# Patient Record
Sex: MALE | Race: WHITE | NOT HISPANIC OR LATINO | Employment: UNEMPLOYED | URBAN - METROPOLITAN AREA
[De-identification: names, ages, dates, MRNs, and addresses within clinical notes are randomized per-mention and may not be internally consistent; named-entity substitution may affect disease eponyms.]

---

## 2020-01-20 ENCOUNTER — OFFICE VISIT - RIVER FALLS (OUTPATIENT)
Dept: FAMILY MEDICINE | Facility: CLINIC | Age: 30
End: 2020-01-20

## 2020-01-20 ASSESSMENT — MIFFLIN-ST. JEOR: SCORE: 2034.48

## 2020-03-12 ENCOUNTER — OFFICE VISIT - RIVER FALLS (OUTPATIENT)
Dept: FAMILY MEDICINE | Facility: CLINIC | Age: 30
End: 2020-03-12

## 2020-03-12 ASSESSMENT — MIFFLIN-ST. JEOR: SCORE: 2098.89

## 2020-03-20 ENCOUNTER — AMBULATORY - RIVER FALLS (OUTPATIENT)
Dept: FAMILY MEDICINE | Facility: CLINIC | Age: 30
End: 2020-03-20

## 2020-03-23 ENCOUNTER — OFFICE VISIT - RIVER FALLS (OUTPATIENT)
Dept: FAMILY MEDICINE | Facility: CLINIC | Age: 30
End: 2020-03-23

## 2020-03-25 ENCOUNTER — OFFICE VISIT - RIVER FALLS (OUTPATIENT)
Dept: FAMILY MEDICINE | Facility: CLINIC | Age: 30
End: 2020-03-25

## 2020-03-25 LAB
CORTIS SERPL-MCNC: 4 MCG/DL
LUTEINIZING HORMONE - HISTORICAL: 1.3 MIU/ML (ref 1.5–9.3)
Lab: 745 NG/ML (ref 267–616)
PROLACTIN: 19.7 NG/ML (ref 2–18)
T4 FREE SERPL-MCNC: 0.9 NG/DL (ref 0.8–1.8)
TESTOSTERONE TOTAL: 73 NG/DL (ref 250–827)
TSH SERPL DL<=0.005 MIU/L-ACNC: 2.02 MIU/L (ref 0.4–4.5)

## 2020-03-25 ASSESSMENT — MIFFLIN-ST. JEOR: SCORE: 2093.44

## 2020-03-26 LAB
CORTISOL, FREE, URINE: 3.8 (ref 4–50)
CREATININE URINE TIMED: 0.95 G/24 H (ref 0.5–2.15)
URINE VOLUME - HISTORICAL: 1350 ML

## 2022-02-03 ENCOUNTER — AMBULATORY - RIVER FALLS (OUTPATIENT)
Dept: FAMILY MEDICINE | Facility: CLINIC | Age: 32
End: 2022-02-03
Payer: COMMERCIAL

## 2022-02-05 ENCOUNTER — AMBULATORY - RIVER FALLS (OUTPATIENT)
Dept: FAMILY MEDICINE | Facility: CLINIC | Age: 32
End: 2022-02-05
Payer: COMMERCIAL

## 2022-02-11 VITALS
BODY MASS INDEX: 32.48 KG/M2 | DIASTOLIC BLOOD PRESSURE: 78 MMHG | OXYGEN SATURATION: 97 % | RESPIRATION RATE: 18 BRPM | WEIGHT: 232 LBS | TEMPERATURE: 97.7 F | HEIGHT: 71 IN | SYSTOLIC BLOOD PRESSURE: 122 MMHG | HEART RATE: 90 BPM

## 2022-02-11 VITALS
HEIGHT: 71 IN | SYSTOLIC BLOOD PRESSURE: 118 MMHG | TEMPERATURE: 97.8 F | HEART RATE: 74 BPM | HEIGHT: 71 IN | DIASTOLIC BLOOD PRESSURE: 64 MMHG | SYSTOLIC BLOOD PRESSURE: 132 MMHG | TEMPERATURE: 97.6 F | BODY MASS INDEX: 34.3 KG/M2 | WEIGHT: 245 LBS | HEART RATE: 60 BPM | DIASTOLIC BLOOD PRESSURE: 78 MMHG | BODY MASS INDEX: 34.47 KG/M2 | WEIGHT: 246.2 LBS

## 2022-02-15 ENCOUNTER — AMBULATORY - RIVER FALLS (OUTPATIENT)
Dept: FAMILY MEDICINE | Facility: CLINIC | Age: 32
End: 2022-02-15
Payer: COMMERCIAL

## 2022-02-16 NOTE — PROGRESS NOTES
Patient:   JOI VILLARREAL            MRN: [id number]            FIN: [id number]               Age:   29 years     Sex:  Male     :  1990   Associated Diagnoses:   Preoperative clearance; Pituitary macroadenoma   Author:   Aaron Gutiérrez MD      Chief Complaint   3/25/2020 9:03 AM CDT    Pre-op, [facility], Pituitary Gland. Does not know day of surgery but it will be next week.        Preoperative Information   Indication for surgery:  Neurologic disorder, pituitary macroadenoma.         Associated symptoms: decreased vision in right eye.    Accompanied by:  No one.    Source of history:  Self, Medical record.           Review of Systems   Constitutional:  No fever, No chills, No sweats, No weakness, No fatigue.    Eye:  Recent visual problem.    Ear/Nose/Mouth/Throat:  No decreased hearing, No nasal congestion, No sore throat.    Respiratory:  No shortness of breath, No cough.    Cardiovascular:  Negative, No chest pain, No palpitations, No peripheral edema.    Gastrointestinal:  No nausea, No vomiting, No diarrhea, No constipation, No heartburn.    Genitourinary:  No dysuria, No change in urine stream.    Hematology/Lymphatics:  No bruising tendency, No bleeding tendency.    Endocrine:  No cold intolerance, No heat intolerance.    Immunologic:  Negative.    Musculoskeletal:  No back pain, No neck pain, No joint pain, No muscle pain.    Integumentary:  No rash, No dryness, No skin lesion.    Neurologic:  Alert and oriented X4, No headache.    Psychiatric:  No anxiety, No depression.       Health Status   Allergies:    Allergic Reactions (Selected)  No known allergies   Medications:  (Selected)      Problem list:    All Problems  Obesity / SNOMED CT 5978959876 / Probable  Mild Asthma / ICD-9-.90 / Confirmed      Histories   Past Medical History:    Active  Mild Asthma (493.90)   Family History:    No family history items have been selected or recorded.   Procedure history:    No active procedure  history items have been selected or recorded.   Social History:        Alcohol Assessment: Current            Current, 2-3 times per week, 1 drinks/episode average.  3 drinks/episode maximum.      Tobacco Assessment: Denies Tobacco Use            Never       Has no history of anemia.  Has no history of DVT or pulmonary embolism.  Has no personal history of bleeding problems.   Has no personal or family history of anesthesia reactions.  Patient does not have active tuberculosis.    S/he has not taken aspirin or aspirin containing products in the last week.     S/he has not taken Plavix (Clopidogrel) in the last 2 weeks.    S/he has not taken warfarin in the past week.    S/he has not been on corticosteroids for more than 2 weeks recently.      S/he is not DNR before, during or after surgery.    Chest pain / SOB walking up 2 flights of steps:  no  Pain in neck or jaw:  no  CAD MI:  no  Afib:  no  Heart Failure:  no  Asthma  or Bronchitis:  no  Diabetes:  no       Insulin/Orals   Seizure Disorder:  no  CKD:  no  Thyroid Disease:  no  Liver Disease:  no  CVA:  no  ZBIGNIEW:  no         Physical Examination   Vital Signs   3/25/2020 9:03 AM CDT Temperature Tympanic 97.6 DegF  LOW    Peripheral Pulse Rate 74 bpm    Pulse Site Radial artery    HR Method Manual    Systolic Blood Pressure 132 mmHg  HI    Diastolic Blood Pressure 64 mmHg    Mean Arterial Pressure 87 mmHg    BP Site Right arm    BP Method Manual      Measurements from flowsheet : Measurements   3/25/2020 9:03 AM CDT Height Measured - Standard 71 in    Weight Measured - Standard 245 lb    BSA 2.36 m2    Body Mass Index 34.17 kg/m2  HI      General:  Alert and oriented, No acute distress.    Eye:  Pupils are equal, round and reactive to light, Extraocular movements are intact, Normal conjunctiva.    HENT:  Normocephalic, Tympanic membranes are clear, Oral mucosa is moist, No pharyngeal erythema, No sinus tenderness.    Neck:  Supple, Non-tender, No carotid bruit, No  lymphadenopathy, No thyromegaly.    Respiratory:  Lungs are clear to auscultation, Respirations are non-labored, Breath sounds are equal, No chest wall tenderness.    Cardiovascular:  Normal rate, Regular rhythm, No murmur, No gallop, Good pulses equal in all extremities, Normal peripheral perfusion, No edema.    Gastrointestinal:  Soft, Non-tender, Non-distended, Normal bowel sounds, No organomegaly.    Genitourinary:  No costovertebral angle tenderness.    Lymphatics:  WNL.    Musculoskeletal:  Normal range of motion, Normal strength, No tenderness, No swelling, No deformity.    Integumentary:  Warm, Dry, No rash.    Neurologic:  Alert, Oriented, Normal sensory, Normal motor function, No focal deficits.    Psychiatric:  Cooperative, Appropriate mood & affect.       Review / Management         Results review:  see copies.    Radiology results   Magnetic Resonance Imaging, see copy      Impression and Plan   Diagnosis     Preoperative clearance (RDQ84-TJ Z01.818).     Pituitary macroadenoma (VDB63-LM D35.2).     Condition:  Stable, The patient is cleared for sedation and the procedure., very low risk for cardiac or pulmonary complications.

## 2022-02-16 NOTE — NURSING NOTE
Comprehensive Intake Entered On:  1/20/2020 4:18 PM CST    Performed On:  1/20/2020 4:13 PM CST by Kellie Robertson LPN               Summary   Chief Complaint :   cough and congestion for the past 5 days, right ear feels plugged. Headache.    Weight Measured :   232 lb(Converted to: 232 lb 0 oz, 105.23 kg)    Height Measured :   71 in(Converted to: 5 ft 11 in, 180.34 cm)    Body Mass Index :   32.35 kg/m2 (HI)    Body Surface Area :   2.29 m2   Systolic Blood Pressure :   122 mmHg   Diastolic Blood Pressure :   78 mmHg   Mean Arterial Pressure :   93 mmHg   Peripheral Pulse Rate :   90 bpm   BP Site :   Right arm   Pulse Site :   Radial artery   BP Method :   Manual   HR Method :   Manual   Temperature Tympanic :   97.7 DegF(Converted to: 36.5 DegC)  (LOW)    Respiratory Rate :   18 br/min   Oxygen Saturation :   97 %   Kellie Robertson LPN - 1/20/2020 4:13 PM CST   Health Status   Allergies Verified? :   Yes   Medication History Verified? :   Yes   Pre-Visit Planning Status :   Completed   Tobacco Use? :   Never smoker   Kellie Robertson LPN - 1/20/2020 4:13 PM CST   Consents   Consent for Immunization Exchange :   Consent Granted   Consent for Immunizations to Providers :   Consent Granted   Kellie Robertson LPN - 1/20/2020 4:13 PM CST   Meds / Allergies   (As Of: 1/20/2020 4:18:44 PM CST)   Allergies (Active)   No known allergies  Estimated Onset Date:   Unspecified ; Created By:   Glenda Ramirez; Reaction Status:   Active ; Category:   Drug ; Substance:   No known allergies ; Type:   Allergy ; Updated By:   Glenda Ramirez; Source:   Patient ; Reviewed Date:   1/20/2020 4:17 PM CST        Medication List   (As Of: 1/20/2020 4:18:44 PM CST)   Prescription/Discharge Order    triamcinolone topical  :   triamcinolone topical ; Status:   Processing ; Ordered As Mnemonic:   triamcinolone 0.1% topical cream ; Ordering Provider:   Aaron Gutiérrez MD; Action Display:   Complete ; Catalog Code:    triamcinolone topical ; Order Dt/Tm:   1/20/2020 4:17:26 PM CST

## 2022-02-16 NOTE — NURSING NOTE
Phone Message    PCP: MACKENZIE    Time of call: 9:41am message left    Person calling: [facility]  Contact # : [phone]    MESSAGE: Calling to get a copy of pre-op H&P. It should be faxed to [phone].    Last visit/reason: 3/25/20 - pre op exam    I spoke with Jodi in [place]. This was faxed over today but to a different fax number. She will re-fax to the fax# listed above.

## 2022-02-16 NOTE — NURSING NOTE
Comprehensive Intake Entered On:  3/12/2020 5:48 PM CDT    Performed On:  3/12/2020 5:42 PM CDT by Minna Meza               Summary   Chief Complaint :   c/o R eye blurriness x1 month, optometrist did not find anything abnormal.   Weight Measured :   246.2 lb(Converted to: 246 lb 3 oz, 111.67 kg)    Height Measured :   71 in(Converted to: 5 ft 11 in, 180.34 cm)    Body Mass Index :   34.33 kg/m2 (HI)    Body Surface Area :   2.36 m2   Systolic Blood Pressure :   118 mmHg   Diastolic Blood Pressure :   78 mmHg   Mean Arterial Pressure :   91 mmHg   Peripheral Pulse Rate :   60 bpm   BP Site :   Right arm   Pulse Site :   Radial artery   BP Method :   Manual   HR Method :   Manual   Temperature Tympanic :   97.8 DegF(Converted to: 36.6 DegC)  (LOW)    Minna Meza - 3/12/2020 5:42 PM CDT   Health Status   Allergies Verified? :   Yes   Medication History Verified? :   Yes   Medical History Verified? :   Yes   Pre-Visit Planning Status :   Completed   Tobacco Use? :   Never smoker   Minna Meza - 3/12/2020 5:42 PM CDT   Consents   Consent for Immunization Exchange :   Consent Granted   Consent for Immunizations to Providers :   Consent Granted   Minna Meza - 3/12/2020 5:42 PM CDT   Meds / Allergies   (As Of: 3/12/2020 5:48:18 PM CDT)   Allergies (Active)   No known allergies  Estimated Onset Date:   Unspecified ; Created By:   Glenda Ramirez; Reaction Status:   Active ; Category:   Drug ; Substance:   No known allergies ; Type:   Allergy ; Updated By:   Glenda Ramirez; Source:   Patient ; Reviewed Date:   3/12/2020 5:48 PM CDT        Medication List   (As Of: 3/12/2020 5:48:18 PM CDT)   No Known Home Medications     Glenda Ramirez MA - 3/12/2020 5:27:35 PM

## 2022-02-16 NOTE — TELEPHONE ENCOUNTER
---------------------  From: Roz Stone LPN   Sent: 3/26/2020 1:44:48 PM CDT  Subject: lab results     Cyndi watson [facility] called and requested patients endocrine labs for pre op.

## 2022-02-16 NOTE — PROGRESS NOTES
Chief Complaint    c/o R eye blurriness x1 month, optometrist did not find anything abnormal.  History of Present Illness      When looking out right eye is able to make out shapes but object is blurry. Symptoms for the past two months. Was seen by optometrist, no ocular structural problems, vision is not correctable, concern or pituitary adenoma or other retro-orbital issues.  Optometrist's note reviewed. Does get some pain behind right eye.      No balance concerns.  No history of trauma.  Review of Systems      Constitutional:  No fever, No chills, No sweats, No weakness, No fatigue.            Eye:  No recent visual problem.            Ear/Nose/Mouth/Throat:  No decreased hearing, No nasal congestion, No sore throat.            Respiratory:  No shortness of breath, No cough.            Cardiovascular:  No chest pain, No palpitations, No peripheral edema.            Gastrointestinal:  No nausea, No vomiting, No diarrhea, No constipation, No heartburn.            Genitourinary:  No dysuria, No change in urine stream.            Hematology/Lymphatics:  No bruising tendency, No bleeding tendency.            Endocrine:  No cold intolerance, No heat intolerance.            Musculoskeletal:  No back pain, No neck pain, No joint pain, No muscle pain.            Integumentary:  No rash.            Neurologic:  Alert and oriented X4, Headache.            Psychiatric:  No anxiety, No depression.            All other systems were reviewed and are negative         Physical Exam   Vitals & Measurements    T: 97.8   F (Tympanic)  HR: 60(Peripheral)  BP: 118/78     HT: 71 in  WT: 246.2 lb  BMI: 34.33           General:  Alert and oriented, No acute distress.            Eye:  Pupils are equal, round and reactive to light, Extraocular movements are intact, Normal conjunctiva.            HENT:  Normocephalic, Tympanic membranes are clear, Normal hearing, Oral mucosa is moist, No pharyngeal erythema.            Neck:  Supple,  Non-tender.            Respiratory:  Respirations are non-labored.            Cardiovascular:  Normal rate, Regular rhythm.            Neurologic:  Alert, Oriented, Normal sensory, Normal motor function, No focal deficits, Normal deep tendon reflexes. Romberg's test Normal. Heel to shin normal, FNF normal.          Psychiatric:  Cooperative, Appropriate mood & affect, Normal judgment.  Assessment/Plan       1. Vision blurred (H53.8)         MRI of brain for vision loss in right eye. Follow up when results are available.  Discussed possible causes               I, Kellie Robertson LPN, acted solely as a scribe for, and in the presence of Dr. Aaron Gutiérrez who performed the services.  Patient Information     Name:JOI VILLARREAL      Address:      [address] [id number]     Sex:Male     YOB: 1990     [phone]     Emergency Contact:SPENCER, CLAUDETTE     MRN:[id number]     FIN:[id number]     Location:[facility]     Date of Service:03/12/2020      Primary Care Physician:       Aaron Gutiérrez MD, [phone]      Attending Physician:       Aaron Gutiérrez MD, [phone]  Problem List/Past Medical History    Ongoing     Mild Asthma     Obesity    Historical     No qualifying data  Medications   No active medications  Allergies    No known allergies  Social History    Smoking Status - 03/12/2020     Never smoker     Alcohol - Current, 02/21/2012      Current, 2-3 times per week, 1 drinks/episode average. 3 drinks/episode maximum., 06/23/2016     Tobacco - Denies Tobacco Use, 09/03/2010      Never, 02/21/2012  Immunizations      Vaccine Date Status          meningococcal conjugate vaccine 09/07/2010 Given          varicella 05/07/2008 Recorded          tetanus/diphth/pertuss (Tdap) adult/adol 05/07/2008 Recorded          Hep B 08/26/2003 Recorded          Hep B 04/10/2003 Recorded          Hep B 01/17/2003 Recorded          Td 01/17/2003  Recorded          varicella 11/06/2002 Recorded          OPV 02/28/1996 Recorded          DTaP 02/28/1996 Recorded          MMR (measles/mumps/rubella) 02/28/1996 Recorded          OPV 07/27/1994 Recorded          DTaP 05/12/1993 Recorded          Hib (PRP-T) 08/13/1992 Recorded          MMR (measles/mumps/rubella) 08/13/1992 Recorded          DTaP 1991 Recorded          Hib (PRP-T) 1991 Recorded          OPV 1991 Recorded          DTaP 1991 Recorded          Hib (PRP-T) 1991 Recorded          OPV 1991 Recorded          DTaP 1991 Recorded          Hib (PRP-T) 1991 Recorded

## 2022-02-16 NOTE — RESULTS
-------------------------------- Original Report -------------------------------    EXAM:  MRI BRAIN AND PITUITARY GLAND WITH AND WITHOUT CONTRAST    CLINICAL INFORMATION:  29 years old Male, with right blurry vision for 2 months.      TECHNICAL INFORMATION: Sagittal whole brain MPR pre and post contrast.  Whole  brain axial T2, T2 FLAIR, DWI.  Coronal T2 FSE fat sat, sagittal and coronal pre  and postcontrast T1-weighted imaging through the sella turcica.  Contrast: Dotarem IV, 11 mL.   Sedation: None.    COMPARISON/CORRELATION: None.    INTERPRETATION:    Heterogeneously enhancing mass within the sella measuring 2.6 x 3.0 x 3.3 cm (AP  x TV x CC), superiorly deviating the infundibulum. There 50% of the left  cavernous ICA is surrounded.  Normal cavernous carotid caliber and flow voids.  The mass contacts but does not encase the left greater than right supraclinoid  ICA.  The optic chiasm is displaced superiorly, greater on the right which  appears mildly thinned (series 12, image 8)  . Normal caliber and signal of the  A1 and proximal A2 anterior cerebral arteries. The mass extends slightly more  superiorly on the right.         Midline structures including the corpus callosum, brainstem and pituitary are  normal in morphology and signal.  Ventricular size is normal in morphology, size  and signal.  No restricted diffusion, acute intracranial hemorrhage, mass, mass  effect or extra-axial fluid collection.  Intracranial major arterial and dural  venous flow voids are maintained. No other mass or intraparenchymal enhancement.    No hyperintense T2 signal in the visualized paranasal sinuses or mastoid air  cells.  No gross orbital lesion.     CONCLUSION:    1. Large heterogeneously enhancing sellar mass with displacement of the right  greater than left aspect of the optic chiasm and optic nerves, most compatible  with pituitary macroadenoma. Recommend neurosurgical consultation.  2. No additional mass, hemorrhage, or  ischemia of the visualized brain  parenchyma.    The results were discussed with Giovanna in the office of Aaron Gutiérrez M.D. on 3/18/2020 1:54 PM CDT and have been documented in [employer].  Message ID [id number].      Read by: Elise Cullen M.D.  Reviewed and Electronically Signed by: Elise Cullen M.D.

## 2022-02-16 NOTE — TELEPHONE ENCOUNTER
---------------------  From: Loida Everett LPN (Phone Messages Pool ([id number]_[facility]))   Sent: 4/1/2020 4:16:44 PM CDT  Subject: labs     Phone Message    PCP:   MACKENZIE      Time of Call:  3:40pm       Person Calling:  pt mom Claudette  Phone number:  [phone]    Note:   Claudette LM stating pt just came out of surgery at [facility]. Claudette says he needs to have labs completed by 4/6.    Returned call and informed Claudette that [facility] will need to send us orders and then pt can schedule labs- she says per discharge paperwork they need BMP, Free T4, Free T3 and TSH sensitive. Told her we will still need orders to be able to order labs to send to [facility] provider. She will call them and have them fax an order. Fax number given.    Last office visit and reason:  3/25/20 General Preoperative NotePatient moms called wondering if we got the orders yet. Let her know we haven't. Should called [facility] again.

## 2022-02-16 NOTE — TELEPHONE ENCOUNTER
---------------------  From: Aaron Gutiérrez MD   Sent: 3/19/2020 9:10:55 AM CDT  Subject: Patient Message - Results Notification        I have informed Baljinder of the MRI results.  He has a pituitary adenoma and will need neurosurgical consultation.  Laboratory evaluation has been ordered.  referral has been sent.

## 2022-02-16 NOTE — TELEPHONE ENCOUNTER
---------------------  From: Loida Everett LPN (Phone Messages Pool ([id number]))   To: Donato Bernardo MD;     Sent: 3/18/2020 1:59:05 PM CDT  Subject: MRI results     Please advise if ok to wait for GTG return tomorrow    Phone Message    PCP:   MACKENZIE      Time of Call:  1:54pm       Person Calling:  Dr. Elise Cullen with [facility]  Phone number:  _    Returned call at:     Note:   Dr. Cullen calling stating pt had an MRI today for some blurry vision. She says it showed a large pituitary mass- probably a pituitary macroadenoma. She says it is at least 3.3cm in diameter and will need a neuro-surgical consult.    Results will also be getting faxed over.    Last office visit and reason:  3/12/20 Vision Loss---------------------  From: Donato Bernardo MD   To: Phone Messages Pool ([id number] [place]);     Sent: 3/18/2020 2:00:21 PM CDT  Subject: RE: MRI results     ok for GTG---------------------  From: Loida Everett LPN (Phone Messages Pool ([id number] [place]))   To: [facility] Message Pool ([id number] [place]);     Sent: 3/18/2020 2:18:32 PM CDT  Subject: FW: MRI results---------------------  From: Glenda Ramirez MA ([employer] Message Pool ([id number] [place]))   To: Aaron Gutiérrez MD;     Sent: 3/18/2020 3:13:39 PM CDT !  Subject: FW: MRI resultsPlease see [facility] message from 3/19/2020.

## 2022-02-16 NOTE — NURSING NOTE
Comprehensive Intake Entered On:  3/25/2020 9:08 AM CDT    Performed On:  3/25/2020 9:03 AM CDT by Kellie Robertson LPN               Summary   Chief Complaint :   Pre-op, [facility], Pituitary Gland. Does not know day of surgery but it will be next week.    Weight Measured :   245 lb(Converted to: 245 lb 0 oz, 111.13 kg)    Height Measured :   71 in(Converted to: 5 ft 11 in, 180.34 cm)    Body Mass Index :   34.17 kg/m2 (HI)    Body Surface Area :   2.36 m2   Systolic Blood Pressure :   132 mmHg (HI)    Diastolic Blood Pressure :   64 mmHg   Mean Arterial Pressure :   87 mmHg   Peripheral Pulse Rate :   74 bpm   BP Site :   Right arm   Pulse Site :   Radial artery   BP Method :   Manual   HR Method :   Manual   Temperature Tympanic :   97.6 DegF(Converted to: 36.4 DegC)  (LOW)    Kellie Robertson LPN - 3/25/2020 9:03 AM CDT   Health Status   Allergies Verified? :   Yes   Medication History Verified? :   Yes   Pre-Visit Planning Status :   Completed   Kellie Robertson LPN - 3/25/2020 9:03 AM CDT   Consents   Consent for Immunization Exchange :   Consent Granted   Consent for Immunizations to Providers :   Consent Granted   Kellie Robertson LPN - 3/25/2020 9:03 AM CDT   Meds / Allergies   (As Of: 3/25/2020 9:08:22 AM CDT)   Allergies (Active)   No known allergies  Estimated Onset Date:   Unspecified ; Created By:   Gelnda Ramirez; Reaction Status:   Active ; Category:   Drug ; Substance:   No known allergies ; Type:   Allergy ; Updated By:   Glenda Ramirez; Source:   Patient ; Reviewed Date:   3/25/2020 9:06 AM CDT        Medication List   (As Of: 3/25/2020 9:08:22 AM CDT)

## 2022-02-16 NOTE — PROGRESS NOTES
Chief Complaint    cough and congestion for the past 5 days, right ear feels plugged. Headache.  History of Present Illness      Patient here for cough that started about 5 days ago. The next day had nasal congestion and fatigue. Had pain in the back of head that caused headache, improved yesterday. Right ear feels plugged. Has been taking Mucinex but has  Review of Systems      Constitutional:  No fever, No chills, No sweats, No weakness, Fatigue.            Eye:  No recent visual problem.            Ear/Nose/Mouth/Throat:  No decreased hearing, Nasal congestion, No sore throat.            Respiratory: No shortness of breath, Cough, No sputum production.            Cardiovascular:  Negative, No chest pain, No palpitations, No peripheral edema.            Gastrointestinal:  No nausea, No vomiting, No diarrhea, No constipation, No heartburn.            Genitourinary:  No dysuria, No change in urine stream.            Hematology/Lymphatics:  No bruising tendency, No bleeding tendency.            Endocrine:  No cold intolerance, No heat intolerance.            Immunologic:  Negative.            Musculoskeletal:  No back pain, No neck pain, No joint pain, No muscle pain.            Integumentary:  No rash, No dryness, No skin lesion.            Neurologic:  Alert and oriented X4, No headache.            Psychiatric:  No anxiety, No depression.  Physical Exam   Vitals & Measurements    T: 97.7   F (Tympanic)  HR: 90(Peripheral)  RR: 18  BP: 122/78  SpO2: 97%     HT: 71 in  WT: 232 lb  BMI: 32.35       General:  Alert and oriented, No acute distress.            Eye:  Pupils are equal, round and reactive to light, Normal conjunctiva.            HENT:  Tympanic membranes are clear, Oral mucosa is moist, No pharyngeal erythema.  Post nasal drainage.       Sinus: No sinus tenderness          Neck:  Supple. No lymphadenopathy.           Respiratory:                  Respirations: Are within normal limits.                  Breath sounds: No wheezes present.                 Cough: Barking, Non Productive.            Cardiovascular:  Normal rate, Regular rhythm, No edema.            Gastrointestinal:  Non-distended.            Musculoskeletal:  Normal gait.            Integumentary:  Warm, No rash.            Psychiatric:  Cooperative, Appropriate mood & affect, Normal judgment.  Assessment/Plan       1. Viral URI (J06.9)        Increase fluids and rest, Can take OTC cough suppressants. Follow up if having a high fever or symptoms get worse.               I, Kellie Robertson LPN, acted solely as a scribe for, and in the presence of Dr. Aaron Gutiérrez who performed the services.  Patient Information     Name:JOI VILLARREAL      Address:      [address] [id number]     Sex:Male     YOB: 1990     [phone]     Emergency Contact:SPENCER, CLAUDETTE     MRN:[id number]     FIN:[id number]     Location:[facility]     Date of Service:01/20/2020      Primary Care Physician:       Aaron Gutiérrez MD, [phone]      Attending Physician:       Aaron Gutiérrez MD, [phone]  Problem List/Past Medical History    Ongoing     Mild Asthma     Obesity    Historical     No qualifying data  Medications   No active medications  Allergies    No known allergies  Social History    Smoking Status - 01/20/2020     Never smoker     Alcohol - Current, 02/21/2012      Current, 2-3 times per week, 1 drinks/episode average. 3 drinks/episode maximum., 06/23/2016     Tobacco - Denies Tobacco Use, 09/03/2010      Never, 02/21/2012  Immunizations      Vaccine Date Status          meningococcal conjugate vaccine 09/07/2010 Given          varicella 05/07/2008 Recorded          tetanus/diphth/pertuss (Tdap) adult/adol 05/07/2008 Recorded          Hep B 08/26/2003 Recorded          Hep B 04/10/2003 Recorded          Hep B 01/17/2003 Recorded          Td 01/17/2003 Recorded          varicella  11/06/2002 Recorded          OPV 02/28/1996 Recorded          DTaP 02/28/1996 Recorded          MMR (measles/mumps/rubella) 02/28/1996 Recorded          OPV 07/27/1994 Recorded          DTaP 05/12/1993 Recorded          Hib (PRP-T) 08/13/1992 Recorded          MMR (measles/mumps/rubella) 08/13/1992 Recorded          DTaP 1991 Recorded          Hib (PRP-T) 1991 Recorded          OPV 1991 Recorded          DTaP 1991 Recorded          Hib (PRP-T) 1991 Recorded          OPV 1991 Recorded          DTaP 1991 Recorded          Hib (PRP-T) 1991 Recorded

## 2022-02-17 ENCOUNTER — AMBULATORY - RIVER FALLS (OUTPATIENT)
Dept: FAMILY MEDICINE | Facility: CLINIC | Age: 32
End: 2022-02-17
Payer: COMMERCIAL

## 2022-03-02 NOTE — NURSING NOTE
PPD Administration POC Entered On:  2/3/2022 10:06 AM CST    Performed On:  2/3/2022 9:50 AM CST by Juju Melgar CMA               PPD Administration   PPD Insertion Site :   Left forearm   PPD Amount Administered (mL) :   0.1 mL   Juju Melgar CMA - 2/3/2022 10:05 AM CST   Details   Collection Date :   2/3/2022 9:50 AM CST   Expiration Date :   4/22/2023 CDT   Lot#/Manufacture :   [id number]   Handling Specimen POC :   tubersol    :   [employer]   POC Test Comments :   Pt instructed to have read in 48-72 hours.  Pt voiced understanding   Juju Melgar CMA - 2/3/2022 10:05 AM CST

## 2022-03-02 NOTE — NURSING NOTE
PPD Reading POC Entered On:  2/17/2022 7:48 AM CST    Performed On:  2/17/2022 7:48 AM CST by Juju Melgar CMA               PPD Reading   PPD mm of Induration :   0 mm   PPD Interpretation :   Negative   PPD Completion Status :   Completed   Juju Melgar CMA - 2/17/2022 7:48 AM CST

## 2022-03-02 NOTE — NURSING NOTE
PPD Administration POC Entered On:  2/15/2022 8:07 AM CST    Performed On:  2/15/2022 8:05 AM CST by Juju Melgar CMA               PPD Administration   PPD Insertion Site :   Left forearm   PPD Amount Administered (mL) :   0.1 mL   Juju Melgar CMA - 2/15/2022 8:07 AM CST   Details   Collection Date :   2/15/2022 8:05 AM CST   Expiration Date :   4/22/2023 CDT   Lot#/Manufacture :   [id number]   Handling Specimen POC :   Tubersol    :   Alma Johns   POC Test Comments :   Pt instructed to have read in 48-72 hours.  Pt voiced understanding.   Juju Melgar CMA - 2/15/2022 8:07 AM CST

## 2022-03-03 ENCOUNTER — ALLIED HEALTH/NURSE VISIT (OUTPATIENT)
Dept: FAMILY MEDICINE | Facility: CLINIC | Age: 32
End: 2022-03-03
Payer: COMMERCIAL

## 2022-03-03 DIAGNOSIS — Z23 NEED FOR COVID-19 VACCINE: Primary | ICD-10-CM

## 2022-03-03 PROCEDURE — 0012A COVID-19,PF,MODERNA (18+ YRS PRIMARY SERIES .5ML): CPT

## 2022-03-03 PROCEDURE — 91301 COVID-19,PF,MODERNA (18+ YRS PRIMARY SERIES .5ML): CPT

## 2023-08-13 ENCOUNTER — HEALTH MAINTENANCE LETTER (OUTPATIENT)
Age: 33
End: 2023-08-13

## 2024-10-05 ENCOUNTER — HEALTH MAINTENANCE LETTER (OUTPATIENT)
Age: 34
End: 2024-10-05